# Patient Record
Sex: FEMALE | Race: WHITE | NOT HISPANIC OR LATINO | URBAN - METROPOLITAN AREA
[De-identification: names, ages, dates, MRNs, and addresses within clinical notes are randomized per-mention and may not be internally consistent; named-entity substitution may affect disease eponyms.]

---

## 2017-01-03 ENCOUNTER — POST OP (OUTPATIENT)
Dept: URBAN - METROPOLITAN AREA CLINIC 30 | Facility: CLINIC | Age: 42
End: 2017-01-03

## 2017-01-03 PROCEDURE — 99024 POSTOP FOLLOW-UP VISIT: CPT | Performed by: OPTOMETRIST

## 2017-01-03 ASSESSMENT — VISUAL ACUITY
OD: 20/20
OS: 20/20

## 2017-04-27 ENCOUNTER — POST OP (OUTPATIENT)
Dept: URBAN - METROPOLITAN AREA CLINIC 30 | Facility: CLINIC | Age: 42
End: 2017-04-27

## 2017-04-27 PROCEDURE — 99024 POSTOP FOLLOW-UP VISIT: CPT | Performed by: OPTOMETRIST

## 2017-04-27 ASSESSMENT — VISUAL ACUITY
OS: 20/20
OD: 20/20

## 2018-12-12 ENCOUNTER — FOLLOW UP ESTABLISHED (OUTPATIENT)
Dept: URBAN - METROPOLITAN AREA CLINIC 30 | Facility: CLINIC | Age: 43
End: 2018-12-12
Payer: COMMERCIAL

## 2018-12-12 DIAGNOSIS — H04.123 TEAR FILM INSUFFICIENCY OF BILATERAL LACRIMAL GLANDS: Primary | ICD-10-CM

## 2018-12-12 PROCEDURE — 92014 COMPRE OPH EXAM EST PT 1/>: CPT | Performed by: OPTOMETRIST

## 2018-12-12 ASSESSMENT — KERATOMETRY
OS: 43.02
OD: 43.22

## 2018-12-12 ASSESSMENT — INTRAOCULAR PRESSURE
OD: 10
OS: 10

## 2018-12-12 ASSESSMENT — VISUAL ACUITY
OD: 20/25
OS: 20/25

## 2019-11-13 ENCOUNTER — FOLLOW UP ESTABLISHED (OUTPATIENT)
Dept: URBAN - METROPOLITAN AREA CLINIC 30 | Facility: CLINIC | Age: 44
End: 2019-11-13
Payer: COMMERCIAL

## 2019-11-13 PROCEDURE — 92014 COMPRE OPH EXAM EST PT 1/>: CPT | Performed by: OPTOMETRIST

## 2019-11-13 PROCEDURE — 92134 CPTRZ OPH DX IMG PST SGM RTA: CPT | Performed by: OPTOMETRIST

## 2019-11-13 ASSESSMENT — VISUAL ACUITY
OD: 20/20
OS: 20/25

## 2019-11-13 ASSESSMENT — INTRAOCULAR PRESSURE
OS: 11
OD: 12

## 2019-11-13 ASSESSMENT — KERATOMETRY
OS: 42.95
OD: 43.16

## 2020-12-29 ENCOUNTER — FOLLOW UP ESTABLISHED (OUTPATIENT)
Dept: URBAN - METROPOLITAN AREA CLINIC 30 | Facility: CLINIC | Age: 45
End: 2020-12-29
Payer: COMMERCIAL

## 2020-12-29 DIAGNOSIS — H43.813 VITREOUS DEGENERATION, BILATERAL: Primary | ICD-10-CM

## 2020-12-29 PROCEDURE — 92014 COMPRE OPH EXAM EST PT 1/>: CPT | Performed by: OPTOMETRIST

## 2020-12-29 ASSESSMENT — VISUAL ACUITY
OS: 20/25
OD: 20/25

## 2020-12-29 ASSESSMENT — KERATOMETRY
OS: 43.14
OD: 43.24

## 2020-12-29 ASSESSMENT — INTRAOCULAR PRESSURE
OD: 10
OS: 10

## 2022-02-28 ENCOUNTER — OFFICE VISIT (OUTPATIENT)
Dept: URBAN - METROPOLITAN AREA CLINIC 30 | Facility: CLINIC | Age: 47
End: 2022-02-28
Payer: COMMERCIAL

## 2022-02-28 DIAGNOSIS — Z98.890 OTHER SPECIFIED POSTPROCEDURAL STATES: Primary | ICD-10-CM

## 2022-02-28 DIAGNOSIS — H52.4 PRESBYOPIA: ICD-10-CM

## 2022-02-28 DIAGNOSIS — H43.393 OTHER VITREOUS OPACITIES, BILATERAL: ICD-10-CM

## 2022-02-28 PROCEDURE — 92134 CPTRZ OPH DX IMG PST SGM RTA: CPT | Performed by: OPTOMETRIST

## 2022-02-28 PROCEDURE — 92014 COMPRE OPH EXAM EST PT 1/>: CPT | Performed by: OPTOMETRIST

## 2022-02-28 ASSESSMENT — INTRAOCULAR PRESSURE
OD: 17
OS: 17

## 2022-02-28 ASSESSMENT — VISUAL ACUITY
OS: 20/20
OD: 20/25

## 2022-02-28 ASSESSMENT — KERATOMETRY
OS: 43.06
OD: 43.22

## 2022-02-28 NOTE — IMPRESSION/PLAN
Impression: Presbyopia: H52.4. Plan: Spec rx issued- NVO, tried +1.50 readers and felt too strong.  Call if NI.

## 2023-03-03 ENCOUNTER — OFFICE VISIT (OUTPATIENT)
Dept: URBAN - METROPOLITAN AREA CLINIC 30 | Facility: CLINIC | Age: 48
End: 2023-03-03
Payer: COMMERCIAL

## 2023-03-03 DIAGNOSIS — H43.813 VITREOUS DEGENERATION, BILATERAL: ICD-10-CM

## 2023-03-03 DIAGNOSIS — H04.123 DRY EYE SYNDROME OF BILATERAL LACRIMAL GLANDS: ICD-10-CM

## 2023-03-03 DIAGNOSIS — Z98.890 OTHER SPECIFIED POSTPROCEDURAL STATES: Primary | ICD-10-CM

## 2023-03-03 PROCEDURE — 99212 OFFICE O/P EST SF 10 MIN: CPT | Performed by: OPTOMETRIST

## 2023-03-03 ASSESSMENT — KERATOMETRY
OS: 43.00
OD: 43.25

## 2023-03-03 ASSESSMENT — VISUAL ACUITY
OS: 20/20
OD: 20/20

## 2023-03-03 ASSESSMENT — INTRAOCULAR PRESSURE
OS: 16
OD: 16

## 2023-03-03 NOTE — IMPRESSION/PLAN
Impression: Vitreous degeneration, bilateral Plan: Retina exam appears stable. Signs and symptoms of RD reviewed. RTC STAT if any increased in floaters or loss of VA. See MAC OCT.

## 2023-03-03 NOTE — IMPRESSION/PLAN
Impression: Tear film insufficiency of bilateral lacrimal glands Plan: Continue AT's qid OU. O3's. Avoid ceiling fans. +computer work. Pt deferred dilation.

## 2024-07-10 ENCOUNTER — OFFICE VISIT (OUTPATIENT)
Dept: URBAN - METROPOLITAN AREA CLINIC 30 | Facility: CLINIC | Age: 49
End: 2024-07-10
Payer: COMMERCIAL

## 2024-07-10 DIAGNOSIS — H52.03 HYPERMETROPIA, BILATERAL: ICD-10-CM

## 2024-07-10 DIAGNOSIS — H52.4 PRESBYOPIA: Primary | ICD-10-CM

## 2024-07-10 DIAGNOSIS — H43.813 VITREOUS DEGENERATION, BILATERAL: ICD-10-CM

## 2024-07-10 DIAGNOSIS — Z98.890 OTHER SPECIFIED POSTPROCEDURAL STATES: ICD-10-CM

## 2024-07-10 PROCEDURE — 92014 COMPRE OPH EXAM EST PT 1/>: CPT

## 2024-07-10 ASSESSMENT — INTRAOCULAR PRESSURE
OD: 18
OS: 17

## 2024-07-10 ASSESSMENT — VISUAL ACUITY
OD: 20/20
OS: 20/20